# Patient Record
Sex: FEMALE | Race: WHITE | Employment: FULL TIME | URBAN - METROPOLITAN AREA
[De-identification: names, ages, dates, MRNs, and addresses within clinical notes are randomized per-mention and may not be internally consistent; named-entity substitution may affect disease eponyms.]

---

## 2017-12-07 LAB — PAP SMEAR, EXTERNAL: NORMAL

## 2019-10-07 LAB — MAMMOGRAPHY, EXTERNAL: NORMAL

## 2021-11-04 ENCOUNTER — OFFICE VISIT (OUTPATIENT)
Dept: ORTHOPEDIC SURGERY | Age: 51
End: 2021-11-04
Payer: COMMERCIAL

## 2021-11-04 VITALS — BODY MASS INDEX: 32.14 KG/M2 | WEIGHT: 200 LBS | HEIGHT: 66 IN

## 2021-11-04 DIAGNOSIS — M79.672 LEFT FOOT PAIN: ICD-10-CM

## 2021-11-04 DIAGNOSIS — S99.922A PLANTAR PLATE INJURY, LEFT, INITIAL ENCOUNTER: Primary | ICD-10-CM

## 2021-11-04 PROCEDURE — 99204 OFFICE O/P NEW MOD 45 MIN: CPT | Performed by: ORTHOPAEDIC SURGERY

## 2021-11-04 NOTE — PROGRESS NOTES
[facility]  Outpatient Progress Note  Edgar Wisdom MD    Patient Name: Kane Ramos MRN: <[id number]>   Age: 45 y.o. YOB: 1970   Sex: female      [facility] Complaint   Patient presents with    New Patient     Left foot / 2nd toe pain         HISTORY OF PRESENT ILLNESS   Kane Ramos is a 45 y.o. female injured her left foot in August.  She was playing with her children in the swimming pool when she struck her forefoot against the side of the pool. She had pain and swelling in the region of the second toe and states she developed some bruising as well. She had x-rays taken which did not show fracture. Since that time she has noted that the second toe has gradually drifted towards the great toe and she continues to have pain particularly with activity and weightbearing in the region of the second metatarsal phalangeal joint which she rates 1 out of 10 at rest and 8 out of 10 at its worst.    Pain Assessment  Location of Pain: Toe  Location Modifiers: Left  Severity of Pain: 8  Quality of Pain: Throbbing, Sharp, Dull, Aching  Duration of Pain: Persistent  Frequency of Pain: Constant  Aggravating Factors: Standing, Walking, Stairs  Limiting Behavior: Some  Relieving Factors: Rest, Ice, Exercise  Result of Injury: Yes (kicked side of pool in August 2021)  Work-Related Injury: No  Are there other pain locations you wish to document?: No    PAST MEDICAL HISTORY    No past medical history on file. PAST SURGICAL HISTORY   No past surgical history on file. MEDICATIONS     No current outpatient medications on file. No current facility-administered medications for this visit. ALLERGIES     Allergies   Allergen Reactions    Seasonal     Sulfa Antibiotics        FAMILY HISTORY   No family history on file.     SOCIAL HISTORY     Social History     Socioeconomic History    Marital status:      Spouse name: Not on file    Number of children: Not on file    Years of education: Not on file    Highest education level: Not on file   Occupational History    Not on file   Tobacco Use    Smoking status: Not on file   Substance and Sexual Activity    Alcohol use: Not on file    Drug use: Not on file    Sexual activity: Not on file   Other Topics Concern    Not on file   Social History Narrative    Not on file     Social Determinants of Health     Financial Resource Strain:     Difficulty of Paying Living Expenses:    Food Insecurity:     Worried About Running Out of Food in the Last Year:     [address] in the Last Year:    Transportation Needs:     Lack of Transportation (Medical):      Lack of Transportation (Non-Medical):    Physical Activity:     Days of Exercise per Week:     Minutes of Exercise per Session:    Stress:     Feeling of Stress :    Social Connections:     Frequency of Communication with Friends and Family:     Frequency of Social Gatherings with Friends and Family:     Attends Restoration Services:     Active Member of Clubs or Organizations:     Attends Club or Organization Meetings:     Marital Status:    Intimate Partner Violence:     Fear of Current or Ex-Partner:     Emotionally Abused:     Physically Abused:     Sexually Abused:        REVIEW OF SYSTEMS   General: no fever, chills, night sweats, anorexia, malaise, fatigue, or weight change  Hematologic:  no unexplained bleeding or bruising  HEENT:   no nasal congestion, rhinorrhea, sore throat, or facial pain  Respiratory:  no cough, dyspnea, or chest pain  Cardiovascular:  no angina, SAHU, PND, orthopnea, dependent edema, or palpitations  Gastrointestinal:  no nausea, vomiting, diarrhea, constipation, or abdominal pain  Genitourinary:  no urinary urgency, frequency, dysuria, or hematuria  Musculoskeletal: see HPI  Endocrine:  no heat or cold intolerance and no polyphagia, polydipsia, or polyuria  Skin:  no skin eruptions or changing lesions  Neurologic:  no focal weakness, numbness/tingling, tremor, or severe headache. See HPI. See HPI for pertinent positives. PHYSICAL EXAM   Vital Signs:   Vitals:    11/04/21 0845   Weight: 200 lb (90.7 kg)   Height: 5' 6\" (1.676 m)       General appearance: healthy, alert, no distress  Skin: Skin color, texture, turgor normal. No rashes or lesions  HEENT: atraumatic, normocephalic. PERRL  Respiratory: Unlabored breathing  Lymphatic: No adenopathy   Neuro: Alert and oriented, normal distal sensation, normal bilateral DTRs  Vascular: Normal distal capillary and distal pulses  Muskuloskeletal Exam: Left foot examination demonstrates no swelling erythema warmth ecchymosis or edema. Weightbearing alignment of the ankle hindfoot and midfoot are normal.  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 In addition the second toe has drifted to the great toe and gently abuts the lateral edge of the great toe but there has not crossed over. Tenderness is reproduced with palpation under the second metatarsal head. RADIOLOGY   X-rays obtained and reviewed in office:  Views bilateral feet standing 3 views    Impression: No acute bony abnormality    IMPRESSION     1. Left foot pain    2. Plantar plate injury, left, initial encounter         PLAN   I had a lengthy discussion with patient today regarding diagnosis and treatment options and recommendations. I suspect she has sustained at least a partial rupture of the plantar plate of the second toe resulting in the clinical deformity. We had a lengthy discussion regarding treatment options both nonsurgical and surgical.  At this time she would like to continue with conservative measures. I will give her a prescription for a pair of custom orthotics with metatarsal pad and relief under the second metatarsal head. We discussed proper shoewear and activity modification. Should she continue to have pain she will return for further discussion of repair of the plantar plate.     A handwritten plan of care was diagrammed in the office today and is scanned into the patient's chart in the media section as part of the permanent electronic medical record. FOLLOWUP     Return if symptoms worsen or fail to improve. Orders Placed This Encounter   Procedures    XR FOOT LEFT (MIN 3 VIEWS)     Standing Status:   Future     Number of Occurrences:   1     Standing Expiration Date:   11/1/2022     Order Specific Question:   Reason for exam:     Answer:   PAIN      No orders of the defined types were placed in this encounter.       Patient was instructed on appropriate use of braces, participation in home exercise programs, healthy lifestyle choices and weight loss as appropriate     Charli Driscoll MD

## 2022-08-23 LAB
CHOLESTEROL, TOTAL: 185 MG/DL (ref 0–199)
GLUCOSE BLD-MCNC: 92 MG/DL (ref 70–99)
HDLC SERPL-MCNC: 60 MG/DL (ref 40–60)
LDL CHOLESTEROL CALCULATED: 107 MG/DL
TRIGL SERPL-MCNC: 89 MG/DL (ref 0–150)

## 2022-10-17 LAB
MAMMOGRAPHY, EXTERNAL: NORMAL
MAMMOGRAPHY, EXTERNAL: NORMAL

## 2022-10-21 ENCOUNTER — TELEPHONE (OUTPATIENT)
Dept: PRIMARY CARE CLINIC | Age: 52
End: 2022-10-21

## 2022-10-21 ENCOUNTER — OFFICE VISIT (OUTPATIENT)
Dept: PRIMARY CARE CLINIC | Age: 52
End: 2022-10-21
Payer: COMMERCIAL

## 2022-10-21 VITALS
RESPIRATION RATE: 16 BRPM | TEMPERATURE: 99 F | DIASTOLIC BLOOD PRESSURE: 88 MMHG | OXYGEN SATURATION: 98 % | HEART RATE: 68 BPM | BODY MASS INDEX: 35 KG/M2 | HEIGHT: 66 IN | WEIGHT: 217.8 LBS | SYSTOLIC BLOOD PRESSURE: 138 MMHG

## 2022-10-21 DIAGNOSIS — L08.9 TOE INFECTION: Primary | ICD-10-CM

## 2022-10-21 DIAGNOSIS — Z23 NEED FOR INFLUENZA VACCINATION: ICD-10-CM

## 2022-10-21 PROCEDURE — 90674 CCIIV4 VAC NO PRSV 0.5 ML IM: CPT | Performed by: NURSE PRACTITIONER

## 2022-10-21 PROCEDURE — 90471 IMMUNIZATION ADMIN: CPT | Performed by: NURSE PRACTITIONER

## 2022-10-21 PROCEDURE — 99203 OFFICE O/P NEW LOW 30 MIN: CPT | Performed by: NURSE PRACTITIONER

## 2022-10-21 RX ORDER — HYDROCORTISONE 2.5% / KETOCONAZOLE 2% 2.5; 2 G/100G; G/100G
CREAM TOPICAL
Qty: 30 G | Refills: 1 | Status: SHIPPED | OUTPATIENT
Start: 2022-10-21

## 2022-10-21 RX ORDER — MULTIVITAMIN
1 TABLET ORAL DAILY
COMMUNITY

## 2022-10-21 SDOH — ECONOMIC STABILITY: FOOD INSECURITY: WITHIN THE PAST 12 MONTHS, THE FOOD YOU BOUGHT JUST DIDN'T LAST AND YOU DIDN'T HAVE MONEY TO GET MORE.: NEVER TRUE

## 2022-10-21 SDOH — ECONOMIC STABILITY: FOOD INSECURITY: WITHIN THE PAST 12 MONTHS, YOU WORRIED THAT YOUR FOOD WOULD RUN OUT BEFORE YOU GOT MONEY TO BUY MORE.: NEVER TRUE

## 2022-10-21 ASSESSMENT — PATIENT HEALTH QUESTIONNAIRE - PHQ9
SUM OF ALL RESPONSES TO PHQ QUESTIONS 1-9: 0
SUM OF ALL RESPONSES TO PHQ9 QUESTIONS 1 & 2: 0
2. FEELING DOWN, DEPRESSED OR HOPELESS: 0
SUM OF ALL RESPONSES TO PHQ QUESTIONS 1-9: 0
1. LITTLE INTEREST OR PLEASURE IN DOING THINGS: 0

## 2022-10-21 ASSESSMENT — SOCIAL DETERMINANTS OF HEALTH (SDOH): HOW HARD IS IT FOR YOU TO PAY FOR THE VERY BASICS LIKE FOOD, HOUSING, MEDICAL CARE, AND HEATING?: NOT HARD AT ALL

## 2022-10-21 ASSESSMENT — ENCOUNTER SYMPTOMS
WHEEZING: 0
COUGH: 0
SHORTNESS OF BREATH: 0

## 2022-10-21 ASSESSMENT — LIFESTYLE VARIABLES
HOW MANY STANDARD DRINKS CONTAINING ALCOHOL DO YOU HAVE ON A TYPICAL DAY: 1 OR 2
HOW OFTEN DO YOU HAVE A DRINK CONTAINING ALCOHOL: MONTHLY OR LESS

## 2022-10-21 NOTE — TELEPHONE ENCOUNTER
[facility] pharmacy called to notify [facility] that for the prescription Ketoconazole-Hydrocortisone 2-2.5 % CREA they don't have both of those medications in the same cream. They have them separately, as hydrocortizone 2.5% and ketoconazole 2%

## 2022-10-21 NOTE — PROGRESS NOTES
PROGRESS NOTE  Date of Service:  10/21/2022  Address: [place] [address] [facility]  [address] [place] [address] [address] [facility]  Dept: [phone]  Loc: [phone]    Subjective:      Patient ID: [id number]  Cathleen Velez is a 47 y.o. female    HPI: patient is here with left foot pain, patient said that it is really hurting her, she said she feels like something is stabbing her. Patient said this is different from when she hurt foot in November of last year. Patient went for pedicure in september and started having the pain after, she denies itching. Patient does works for [employer], patient has 2 kids. Review of Systems   Constitutional:  Negative for chills, fatigue and fever. Respiratory:  Negative for cough, shortness of breath and wheezing. All other systems reviewed and are negative. Objective:   Physical Exam  Vitals reviewed. Constitutional:       Appearance: Normal appearance. Cardiovascular:      Rate and Rhythm: Normal rate and regular rhythm. Pulses: Normal pulses. Heart sounds: Normal heart sounds. Pulmonary:      Effort: Pulmonary effort is normal.      Breath sounds: Normal breath sounds. Musculoskeletal:        Feet:    Feet:      Left foot:      Skin integrity: Blister present. Toenail Condition: Fungal disease present. Skin:     Capillary Refill: Capillary refill takes less than 2 seconds. Neurological:      General: No focal deficit present. Mental Status: She is alert and oriented to person, place, and time. Psychiatric:         Mood and Affect: Mood normal.         Behavior: Behavior normal.         Thought Content: Thought content normal.         Judgment: Judgment normal.       Plan:   1. Toe infection unsure etiology of toe infection. Patient has tried over-the-counter antifungal creams will try prescription with steroids. Patient also see podiatry as well.   We will also get an x-ray of the foot to make sure there is no foreign body. Low suspicion for foreign body. -     Ketoconazole-Hydrocortisone 2-2.5 % CREA; Apply a pea size amount twice a day to infected area., Disp-30 g, R-1Normal  -     [facility] - Rex Dominguez DPM, Podiatry, [place]  2. Need for influenza vaccination  -     Influenza, FLUCELVAX, (age 10 mo+), IM, Preservative Free, 0.5 mL           Electronically signed by COLLEEN Mcarthur CNP on 10/21/22 at 11:36 AM EDT     This dictation was generated by voice recognition computer software. Although all attempts are made to edit the dictation for accuracy, there may be errors in the transcription that were not intended.

## 2022-10-24 ENCOUNTER — PATIENT MESSAGE (OUTPATIENT)
Dept: PRIMARY CARE CLINIC | Age: 52
End: 2022-10-24

## 2023-09-19 LAB
CHOLEST SERPL-MCNC: 264 MG/DL (ref 0–199)
GLUCOSE SERPL-MCNC: 95 MG/DL (ref 70–99)
HDLC SERPL-MCNC: 45 MG/DL (ref 40–60)
LDLC SERPL CALC-MCNC: 176 MG/DL
TRIGL SERPL-MCNC: 215 MG/DL (ref 0–150)

## 2024-02-20 SDOH — HEALTH STABILITY: PHYSICAL HEALTH: ON AVERAGE, HOW MANY MINUTES DO YOU ENGAGE IN EXERCISE AT THIS LEVEL?: 90 MIN

## 2024-02-20 SDOH — HEALTH STABILITY: PHYSICAL HEALTH: ON AVERAGE, HOW MANY DAYS PER WEEK DO YOU ENGAGE IN MODERATE TO STRENUOUS EXERCISE (LIKE A BRISK WALK)?: 3 DAYS

## 2024-02-21 ENCOUNTER — OFFICE VISIT (OUTPATIENT)
Dept: PRIMARY CARE CLINIC | Age: 54
End: 2024-02-21
Payer: COMMERCIAL

## 2024-02-21 VITALS
TEMPERATURE: 97.1 F | HEART RATE: 75 BPM | WEIGHT: 219 LBS | OXYGEN SATURATION: 100 % | BODY MASS INDEX: 35.2 KG/M2 | DIASTOLIC BLOOD PRESSURE: 84 MMHG | HEIGHT: 66 IN | SYSTOLIC BLOOD PRESSURE: 138 MMHG | RESPIRATION RATE: 16 BRPM

## 2024-02-21 DIAGNOSIS — R03.0 ELEVATED BP WITHOUT DIAGNOSIS OF HYPERTENSION: ICD-10-CM

## 2024-02-21 DIAGNOSIS — Z87.828 HX OF TEAR OF MENISCUS OF KNEE JOINT: ICD-10-CM

## 2024-02-21 DIAGNOSIS — E66.01 SEVERE OBESITY (BMI 35.0-39.9) WITH COMORBIDITY (HCC): Primary | ICD-10-CM

## 2024-02-21 DIAGNOSIS — R63.5 ABNORMAL WEIGHT GAIN: ICD-10-CM

## 2024-02-21 DIAGNOSIS — E78.5 ELEVATED LIPIDS: ICD-10-CM

## 2024-02-21 PROCEDURE — 99204 OFFICE O/P NEW MOD 45 MIN: CPT | Performed by: STUDENT IN AN ORGANIZED HEALTH CARE EDUCATION/TRAINING PROGRAM

## 2024-02-21 SDOH — ECONOMIC STABILITY: INCOME INSECURITY: HOW HARD IS IT FOR YOU TO PAY FOR THE VERY BASICS LIKE FOOD, HOUSING, MEDICAL CARE, AND HEATING?: NOT HARD AT ALL

## 2024-02-21 SDOH — ECONOMIC STABILITY: FOOD INSECURITY: WITHIN THE PAST 12 MONTHS, YOU WORRIED THAT YOUR FOOD WOULD RUN OUT BEFORE YOU GOT MONEY TO BUY MORE.: NEVER TRUE

## 2024-02-21 SDOH — ECONOMIC STABILITY: FOOD INSECURITY: WITHIN THE PAST 12 MONTHS, THE FOOD YOU BOUGHT JUST DIDN'T LAST AND YOU DIDN'T HAVE MONEY TO GET MORE.: NEVER TRUE

## 2024-02-21 SDOH — ECONOMIC STABILITY: HOUSING INSECURITY
IN THE LAST 12 MONTHS, WAS THERE A TIME WHEN YOU DID NOT HAVE A STEADY PLACE TO SLEEP OR SLEPT IN A SHELTER (INCLUDING NOW)?: NO

## 2024-02-21 ASSESSMENT — PATIENT HEALTH QUESTIONNAIRE - PHQ9
SUM OF ALL RESPONSES TO PHQ QUESTIONS 1-9: 0
1. LITTLE INTEREST OR PLEASURE IN DOING THINGS: 0
2. FEELING DOWN, DEPRESSED OR HOPELESS: 0
SUM OF ALL RESPONSES TO PHQ QUESTIONS 1-9: 0
SUM OF ALL RESPONSES TO PHQ QUESTIONS 1-9: 0
SUM OF ALL RESPONSES TO PHQ9 QUESTIONS 1 & 2: 0
SUM OF ALL RESPONSES TO PHQ QUESTIONS 1-9: 0

## 2024-02-21 NOTE — PROGRESS NOTES
[facility]  [facility]  [address] [place] [address]  [address]  [facility] [address]  Dept: [phone]  Dept Fax: [phone]  Loc: [phone]      Susan Milligan is a 53 y.o. female who presents today for:  Chief Complaint   Patient presents with    Establish Care    Weight Gain         HPI:   Susan Milligan is 53 y.o.  who presents today for establishing care.     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     Medical history of:   Meniscus tear - follows with Orthopedics, rarely causes pain.   Had eye appt recently and was WNL   S/p endometrial ablation x6 years ago- no periods x4 months; some night sweats/hot flashes.   BP elevated w/o HTN - periodically elevated in the last year. 20 lb weight gain in last 4 years.       Weight gain:    Diet - tracking calories 1600 kcal, 40/30/40 macro split. Does not eat out.   Sleep - 5-6 hours per night; feels okay on it. Worse with travel.   Exercise - Weights, walking daily.     Specialists:   Ortho - Dr. Fernandez ([facility]) - looking for new ortho.     Preventative Care:    Vaccines - due for Shingrix.   Colon Ca Screen - 2023  Mammo - Due in October  Pap - Due      Objective:     Vitals:    02/21/24 1424 02/21/24 1432   BP: (!) 140/82 138/84   Site: Right Upper Arm    Position: Sitting    Cuff Size: Large Adult    Pulse: 75    Resp: 16    Temp: 97.1 °F (36.2 °C)    SpO2: 100%    Weight: 99.3 kg (219 lb)    Height: 1.676 m (5' 6\")        Wt Readings from Last 3 Encounters:   02/21/24 99.3 kg (219 lb)   10/21/22 98.8 kg (217 lb 12.8 oz)   11/04/21 90.7 kg (200 lb)       BP Readings from Last 3 Encounters:   02/21/24 138/84   10/21/22 138/88       No results found for: \"WBC\", \"HGB\", \"HCT\", \"MCV\", \"PLT\"  Lab Results   Component Value Date    GLUCOSE 95 09/19/2023     No results found for: \"TSH\", \"Q9WRCGI\", \"L6FBWFN\", \"THYROIDAB\", \"FT3\", \"T4FREE\"  No results found

## 2024-03-01 DIAGNOSIS — E78.5 ELEVATED LIPIDS: ICD-10-CM

## 2024-03-01 DIAGNOSIS — R63.5 ABNORMAL WEIGHT GAIN: ICD-10-CM

## 2024-03-01 LAB
ALBUMIN SERPL-MCNC: 4.5 G/DL (ref 3.4–5)
ALBUMIN/GLOB SERPL: 2.5 {RATIO} (ref 1.1–2.2)
ALP SERPL-CCNC: 63 U/L (ref 40–129)
ALT SERPL-CCNC: 28 U/L (ref 10–40)
ANION GAP SERPL CALCULATED.3IONS-SCNC: 9 MMOL/L (ref 3–16)
AST SERPL-CCNC: 21 U/L (ref 15–37)
BASOPHILS # BLD: 0 K/UL (ref 0–0.2)
BASOPHILS NFR BLD: 0.6 %
BILIRUB SERPL-MCNC: 1.1 MG/DL (ref 0–1)
BUN SERPL-MCNC: 14 MG/DL (ref 7–20)
CALCIUM SERPL-MCNC: 9.4 MG/DL (ref 8.3–10.6)
CHLORIDE SERPL-SCNC: 102 MMOL/L (ref 99–110)
CHOLEST SERPL-MCNC: 221 MG/DL (ref 0–199)
CO2 SERPL-SCNC: 27 MMOL/L (ref 21–32)
CREAT SERPL-MCNC: 0.7 MG/DL (ref 0.6–1.1)
DEPRECATED RDW RBC AUTO: 13.2 % (ref 12.4–15.4)
EOSINOPHIL # BLD: 0.1 K/UL (ref 0–0.6)
EOSINOPHIL NFR BLD: 2.4 %
FSH SERPL-ACNC: 4 MIU/ML
GFR SERPLBLD CREATININE-BSD FMLA CKD-EPI: >60 ML/MIN/{1.73_M2}
GLUCOSE SERPL-MCNC: 92 MG/DL (ref 70–99)
HCT VFR BLD AUTO: 41.1 % (ref 36–48)
HDLC SERPL-MCNC: 48 MG/DL (ref 40–60)
HGB BLD-MCNC: 14 G/DL (ref 12–16)
LDL CHOLESTEROL CALCULATED: 149 MG/DL
LYMPHOCYTES # BLD: 1.6 K/UL (ref 1–5.1)
LYMPHOCYTES NFR BLD: 34.2 %
MCH RBC QN AUTO: 30.8 PG (ref 26–34)
MCHC RBC AUTO-ENTMCNC: 34 G/DL (ref 31–36)
MCV RBC AUTO: 90.4 FL (ref 80–100)
MONOCYTES # BLD: 0.3 K/UL (ref 0–1.3)
MONOCYTES NFR BLD: 7.2 %
NEUTROPHILS # BLD: 2.7 K/UL (ref 1.7–7.7)
NEUTROPHILS NFR BLD: 55.6 %
PLATELET # BLD AUTO: 174 K/UL (ref 135–450)
PMV BLD AUTO: 10.2 FL (ref 5–10.5)
POTASSIUM SERPL-SCNC: 5.2 MMOL/L (ref 3.5–5.1)
PROT SERPL-MCNC: 6.3 G/DL (ref 6.4–8.2)
RBC # BLD AUTO: 4.55 M/UL (ref 4–5.2)
SODIUM SERPL-SCNC: 138 MMOL/L (ref 136–145)
TRIGL SERPL-MCNC: 121 MG/DL (ref 0–150)
TSH SERPL DL<=0.005 MIU/L-ACNC: 1.95 UIU/ML (ref 0.27–4.2)
VLDLC SERPL CALC-MCNC: 24 MG/DL
WBC # BLD AUTO: 4.8 K/UL (ref 4–11)

## 2024-03-02 LAB
EST. AVERAGE GLUCOSE BLD GHB EST-MCNC: 93.9 MG/DL
HBA1C MFR BLD: 4.9 %

## 2024-03-04 DIAGNOSIS — R79.89 LOW SERUM FOLLICLE STIMULATING HORMONE (FSH): ICD-10-CM

## 2024-03-04 DIAGNOSIS — R63.5 ABNORMAL WEIGHT GAIN: Primary | ICD-10-CM

## 2024-03-15 ENCOUNTER — OFFICE VISIT (OUTPATIENT)
Dept: PRIMARY CARE CLINIC | Age: 54
End: 2024-03-15
Payer: COMMERCIAL

## 2024-03-15 VITALS
DIASTOLIC BLOOD PRESSURE: 88 MMHG | SYSTOLIC BLOOD PRESSURE: 138 MMHG | WEIGHT: 211 LBS | BODY MASS INDEX: 33.91 KG/M2 | RESPIRATION RATE: 16 BRPM | HEIGHT: 66 IN | OXYGEN SATURATION: 98 % | TEMPERATURE: 96.9 F | HEART RATE: 60 BPM

## 2024-03-15 DIAGNOSIS — R63.5 ABNORMAL WEIGHT GAIN: ICD-10-CM

## 2024-03-15 DIAGNOSIS — R79.89 LOW SERUM FOLLICLE STIMULATING HORMONE (FSH): ICD-10-CM

## 2024-03-15 DIAGNOSIS — Z12.4 CERVICAL CANCER SCREENING: Primary | ICD-10-CM

## 2024-03-15 DIAGNOSIS — N85.2 ENLARGED UTERUS: ICD-10-CM

## 2024-03-15 LAB
CORTIS SERPL-MCNC: 11 UG/DL
PROLACTIN SERPL IA-MCNC: 4.9 NG/ML

## 2024-03-15 PROCEDURE — 99213 OFFICE O/P EST LOW 20 MIN: CPT | Performed by: STUDENT IN AN ORGANIZED HEALTH CARE EDUCATION/TRAINING PROGRAM

## 2024-03-15 NOTE — PROGRESS NOTES
Running Out of Food in the Last Year: Never true     Ran Out of Food in the Last Year: Never true       Assessment / Plan:   1. Cervical cancer screening  - PAP SMEAR    2. Enlarged uterus  Noted on exam, check US, if abnormal, refer GYN  - US NON OB TRANSVAGINAL; Future    3. Low serum follicle stimulating hormone (FSH)  Complete labs as ordered, f/u endo vs GYN if abnormal        Patient given educational materials - see patient instructions.  Discussed use, benefit, and side effects of prescribed medications.  All patient questions answered.  They voiced understanding. Patient agreed with treatment plan. Follow up as directed.        No follow-ups on file.      Future Appointments   Date Time Provider Department Center   3[facility]19/2024  [facility]         Electronically signed by Gauri Savage DO on 3/15/2024 at 3:35 PM

## 2024-03-18 LAB
ALDOST SERPL-MCNC: 11.7 NG/DL
ALDOST/RENIN PLAS-RTO: 58.5 RATIO
RENIN PLAS-CCNC: 0.2 NG/ML/HR

## 2024-03-19 LAB
DHEA-S SERPL-MCNC: 248 UG/DL (ref 35.4–256)
SHBG SERPL-SCNC: 47 NMOL/L (ref 17–125)
TESTOST FREE SERPL-MCNC: 3.7 PG/ML (ref 0.6–3.8)
TESTOST SERPL-MCNC: 26 NG/DL (ref 3–41)

## 2024-03-20 ENCOUNTER — HOSPITAL ENCOUNTER (OUTPATIENT)
Dept: ULTRASOUND IMAGING | Age: 54
Discharge: HOME | End: 2024-03-20
Attending: STUDENT IN AN ORGANIZED HEALTH CARE EDUCATION/TRAINING PROGRAM
Payer: COMMERCIAL

## 2024-03-20 DIAGNOSIS — N85.2 ENLARGED UTERUS: ICD-10-CM

## 2024-03-20 LAB
ACTH PLAS-MCNC: 17 PG/ML (ref 7–63)
DHEA SERPL-MCNC: 3.81 NG/ML (ref 0.63–4.7)
ESTRADIOL SERPL HS-MCNC: 47.4 PG/ML
ESTROGEN SERPL CALC-MCNC: 99.5 PG/ML
ESTRONE SERPL-MCNC: 52.1 PG/ML
HPV HR 12 DNA SPEC QL NAA+PROBE: NOT DETECTED
HPV16 DNA SPEC QL NAA+PROBE: NOT DETECTED
HPV16+18+H RISK 12 DNA SPEC-IMP: NORMAL
HPV18 DNA SPEC QL NAA+PROBE: NOT DETECTED

## 2024-03-20 PROCEDURE — 76830 TRANSVAGINAL US NON-OB: CPT

## 2024-03-20 PROCEDURE — 76856 US EXAM PELVIC COMPLETE: CPT

## 2024-05-03 DIAGNOSIS — L03.116 CELLULITIS OF LEFT LOWER EXTREMITY: Primary | ICD-10-CM

## 2024-05-03 RX ORDER — CLINDAMYCIN HYDROCHLORIDE 300 MG/1
300 CAPSULE ORAL 3 TIMES DAILY
Qty: 30 CAPSULE | Refills: 0 | Status: SHIPPED | OUTPATIENT
Start: 2024-05-03 | End: 2024-05-13

## 2024-05-16 ENCOUNTER — E-VISIT (OUTPATIENT)
Dept: PRIMARY CARE CLINIC | Age: 54
End: 2024-05-16
Payer: COMMERCIAL

## 2024-05-16 DIAGNOSIS — B37.31 VAGINAL CANDIDIASIS: Primary | ICD-10-CM

## 2024-05-16 PROBLEM — N85.2 ENLARGED UTERUS: Status: RESOLVED | Noted: 2024-03-15 | Resolved: 2024-05-16

## 2024-05-16 PROCEDURE — 99421 OL DIG E/M SVC 5-10 MIN: CPT | Performed by: STUDENT IN AN ORGANIZED HEALTH CARE EDUCATION/TRAINING PROGRAM

## 2024-05-16 RX ORDER — FLUCONAZOLE 150 MG/1
150 TABLET ORAL
Qty: 2 TABLET | Refills: 0 | Status: SHIPPED | OUTPATIENT
Start: 2024-05-16 | End: 2024-05-22

## 2024-05-16 NOTE — PROGRESS NOTES
Susan Milligan (1970) initiated an asynchronous digital communication through [employer].    HPI: per patient questionnaire     Exam: not applicable    Diagnoses and all orders for this visit:  Diagnoses and all orders for this visit:    Vaginal candidiasis  Acute, start diflucan  -     fluconazole (DIFLUCAN) 150 MG tablet; Take 1 tablet by mouth every 72 hours for 6 days      Time: EV1 - 5-10 minutes were spent on the digital evaluation and management of this patient.    Gauri Savage, DO

## 2025-05-05 ENCOUNTER — HOSPITAL ENCOUNTER (OUTPATIENT)
Age: 55
Discharge: HOME | End: 2025-05-05
Payer: COMMERCIAL

## 2025-05-05 ENCOUNTER — OFFICE VISIT (OUTPATIENT)
Dept: URGENT CARE | Age: 55
End: 2025-05-05

## 2025-05-05 ENCOUNTER — RESULTS FOLLOW-UP (OUTPATIENT)
Dept: URGENT CARE | Age: 55
End: 2025-05-05

## 2025-05-05 VITALS
TEMPERATURE: 98.1 F | BODY MASS INDEX: 35.68 KG/M2 | WEIGHT: 222 LBS | HEART RATE: 69 BPM | DIASTOLIC BLOOD PRESSURE: 92 MMHG | SYSTOLIC BLOOD PRESSURE: 160 MMHG | OXYGEN SATURATION: 98 % | HEIGHT: 66 IN

## 2025-05-05 DIAGNOSIS — R03.0 ELEVATED BLOOD PRESSURE READING: ICD-10-CM

## 2025-05-05 DIAGNOSIS — S90.111A CONTUSION OF RIGHT GREAT TOE WITHOUT DAMAGE TO NAIL, INITIAL ENCOUNTER: ICD-10-CM

## 2025-05-05 DIAGNOSIS — L03.031 CELLULITIS OF TOE OF RIGHT FOOT: Primary | ICD-10-CM

## 2025-05-05 PROCEDURE — 73620 X-RAY EXAM OF FOOT: CPT

## 2025-05-05 RX ORDER — CEPHALEXIN 500 MG/1
500 CAPSULE ORAL 2 TIMES DAILY
Qty: 14 CAPSULE | Refills: 0 | Status: SHIPPED | OUTPATIENT
Start: 2025-05-05 | End: 2025-05-12

## 2025-05-05 ASSESSMENT — ENCOUNTER SYMPTOMS
SHORTNESS OF BREATH: 0
COUGH: 0
VOMITING: 0
NAUSEA: 0

## 2025-05-05 NOTE — PATIENT INSTRUCTIONS
Infection to toe: Take medication as prescribed.   Warm epson salt soak.  Elevate area as much as possible.  Keep open to air as much as possible.  Watch for signs of expanding redness, streaking, fever, or worsening. If this occurs let us know as you may need to proceed to ER.    Xray given for toe contusion. We will call with results.

## 2025-05-15 ENCOUNTER — E-VISIT (OUTPATIENT)
Dept: PRIMARY CARE CLINIC | Age: 55
End: 2025-05-15
Payer: COMMERCIAL

## 2025-05-15 DIAGNOSIS — B37.31 VAGINAL CANDIDIASIS: Primary | ICD-10-CM

## 2025-05-15 PROCEDURE — 99421 OL DIG E/M SVC 5-10 MIN: CPT | Performed by: STUDENT IN AN ORGANIZED HEALTH CARE EDUCATION/TRAINING PROGRAM

## 2025-05-15 RX ORDER — FLUCONAZOLE 150 MG/1
150 TABLET ORAL
Qty: 2 TABLET | Refills: 0 | Status: SHIPPED | OUTPATIENT
Start: 2025-05-15 | End: 2025-05-21

## 2025-05-15 NOTE — PROGRESS NOTES
Lab Results   Component Value Date/Time     03/01/2024 08:27 AM    K 5.2 03/01/2024 08:27 AM     03/01/2024 08:27 AM    CO2 27 03/01/2024 08:27 AM    BUN 14 03/01/2024 08:27 AM    CREATININE 0.7 03/01/2024 08:27 AM    GLUCOSE 92 03/01/2024 08:27 AM    CALCIUM 9.4 03/01/2024 08:27 AM        Allergies   Allergen Reactions    Cat Dander Anaphylaxis and Hives    Environmental/Seasonal     Sulfa Antibiotics          Current Outpatient Medications:     fluconazole (DIFLUCAN) 150 MG tablet, Take 1 tablet by mouth every 72 hours for 6 days, Disp: 2 tablet, Rfl: 0    Cetirizine HCl 10 MG CAPS, Take by mouth, Disp: , Rfl:     Multiple Vitamin (MULTIVITAMIN) tablet, Take 1 tablet by mouth daily, Disp: , Rfl:       Notes/ symptoms reviewed.    Allergies, current meds, and pertinent recent labs reviewed.    Rx sent to pharmacy and pt notified via [employer].     5-10 minutes were spent on the digital evaluation and management of this patient.    Vaginal yeast infection  Start diflucan, repeat after 72 hours if persistent symptoms  If no improvement, schedule in person visit to evaluate with me or with GYN.     Electronically signed by Gauri Savage DO on 5/15/2025 at 9:09 AM